# Patient Record
Sex: MALE | Race: WHITE
[De-identification: names, ages, dates, MRNs, and addresses within clinical notes are randomized per-mention and may not be internally consistent; named-entity substitution may affect disease eponyms.]

---

## 2022-06-07 ENCOUNTER — HOSPITAL ENCOUNTER (INPATIENT)
Dept: HOSPITAL 4 - SED | Age: 82
LOS: 2 days | Discharge: HOME | DRG: 69 | End: 2022-06-09
Attending: FAMILY MEDICINE | Admitting: FAMILY MEDICINE
Payer: MEDICARE

## 2022-06-07 VITALS — WEIGHT: 172 LBS | HEIGHT: 66 IN | BODY MASS INDEX: 27.64 KG/M2

## 2022-06-07 VITALS — SYSTOLIC BLOOD PRESSURE: 154 MMHG

## 2022-06-07 DIAGNOSIS — M17.0: ICD-10-CM

## 2022-06-07 DIAGNOSIS — Z20.822: ICD-10-CM

## 2022-06-07 DIAGNOSIS — E78.5: ICD-10-CM

## 2022-06-07 DIAGNOSIS — G45.9: Primary | ICD-10-CM

## 2022-06-07 DIAGNOSIS — I10: ICD-10-CM

## 2022-06-07 LAB
ALBUMIN SERPL BCP-MCNC: 3.7 G/DL (ref 3.4–4.8)
ALT SERPL W P-5'-P-CCNC: 24 U/L (ref 12–78)
ANION GAP SERPL CALCULATED.3IONS-SCNC: 9 MMOL/L (ref 5–15)
AST SERPL W P-5'-P-CCNC: 20 U/L (ref 10–37)
BASOPHILS # BLD AUTO: 0.3 K/UL (ref 0–0.2)
BASOPHILS NFR BLD AUTO: 3.1 % (ref 0–2)
BILIRUB SERPL-MCNC: 0.5 MG/DL (ref 0–1)
BUN SERPL-MCNC: 20 MG/DL (ref 8–21)
CALCIUM SERPL-MCNC: 8.1 MG/DL (ref 8.4–11)
CHLORIDE SERPL-SCNC: 105 MMOL/L (ref 98–107)
CREAT SERPL-MCNC: 1.14 MG/DL (ref 0.55–1.3)
EOSINOPHIL # BLD AUTO: 0.3 K/UL (ref 0–0.4)
EOSINOPHIL NFR BLD AUTO: 3.1 % (ref 0–4)
ERYTHROCYTE [DISTWIDTH] IN BLOOD BY AUTOMATED COUNT: 14.3 % (ref 9–15)
GFR SERPL CREATININE-BSD FRML MDRD: (no result) ML/MIN (ref 90–?)
GLUCOSE SERPL-MCNC: 93 MG/DL (ref 70–99)
HCT VFR BLD AUTO: 44.7 % (ref 36–54)
HGB BLD-MCNC: 15.3 G/DL (ref 14–18)
INR PPP: 1 (ref 0.8–1.2)
LYMPHOCYTES # BLD AUTO: 2.6 K/UL (ref 1–5.5)
LYMPHOCYTES NFR BLD AUTO: 30.2 % (ref 20.5–51.5)
MCH RBC QN AUTO: 30 PG (ref 27–31)
MCHC RBC AUTO-ENTMCNC: 34 % (ref 32–36)
MCV RBC AUTO: 89 FL (ref 79–98)
MONOCYTES # BLD MANUAL: 0.6 K/UL (ref 0–1)
MONOCYTES # BLD MANUAL: 7.5 % (ref 1.7–9.3)
NEUTROPHILS # BLD AUTO: 4.7 K/UL (ref 1.8–7.7)
NEUTROPHILS NFR BLD AUTO: 56.1 % (ref 40–70)
PLATELET # BLD AUTO: 183 K/UL (ref 130–430)
POTASSIUM SERPL-SCNC: 3.5 MMOL/L (ref 3.5–5.1)
PROTHROMBIN TIME: 10.4 SECS (ref 9.5–12.5)
RBC # BLD AUTO: 5.03 MIL/UL (ref 4.2–6.2)
SODIUM SERPLBLD-SCNC: 137 MMOL/L (ref 136–145)
WBC # BLD AUTO: 8.5 K/UL (ref 4.8–10.8)

## 2022-06-07 PROCEDURE — G0378 HOSPITAL OBSERVATION PER HR: HCPCS

## 2022-06-07 NOTE — NUR
PT BACK TO BED 2 PLACE ON C-MONITOR. PT DENIES ANY PAIN OR NEEDS AT THIS TIME. 
EKG DONE. PT TOLERATED WELL. PT IN POSITION OF COMFORT. WILL CONTINUE TO 
MONITOR.

## 2022-06-07 NOTE — NUR
PT HAS NO FACIAL DROOP, STRONG  AND PULLS BILATER UPPER AND LOWER 
EXTREMITIES. NO NEURO DEFICITS NOTED AT THIS TIME.

## 2022-06-07 NOTE — NUR
REPORT AND CARE TO GLORIA VIVAR WITH FULL RETURNED VERBAL UNDERSTANDING. PT IN 
POSITION OF COMFORT. VSS. DENIES ANY PAIN OR NEEDS AT THIS TIME.

## 2022-06-07 NOTE — NUR
PT TRANSPORTED TO CT ON AMBULANCE GURNEY. PT STABLE A&OX4, NO S/S OF DISTRESS 
NOTED. PT DENIES ANY PAIN AT THIS TIME.

## 2022-06-07 NOTE — NUR
PT ARRIVES BY EMS FOR POSSIBLE STROKE, DR. MOSER PRESENT WITH MYSELF FOR 
EXAM. REPORT GIVEN BY EMS PARAMEDIC. CODE STROKE ACTIVATED


-------------------------------------------------------------------------------

Addendum: 06/07/22 at 2108 by [email or web address]

-------------------------------------------------------------------------------

PT PLACE IN ALSO PLACED IN [facility]

## 2022-06-07 NOTE — NUR
Pt resting comfortably in bed. No signs of acute distress. Breathing adequately 
on RA. Fam member at bedside.

## 2022-06-08 VITALS — SYSTOLIC BLOOD PRESSURE: 137 MMHG

## 2022-06-08 VITALS — SYSTOLIC BLOOD PRESSURE: 134 MMHG

## 2022-06-08 RX ADMIN — ENOXAPARIN SODIUM SCH MG: 40 INJECTION SUBCUTANEOUS at 09:43

## 2022-06-08 RX ADMIN — Medication SCH MG: at 09:00

## 2022-06-08 NOTE — NUR
OPENING NOTE

REPORT RECEIVED FROM DAYSHIFT NURSE. PATIENT RECEIVED LYING IN BED, AWAKE, NO S/S OF ACUTE 
DISTRESS, PATIENT DENIES PAIN. BREATHING EVEN AND UNLABORED. IV SITE PATENT, NO SIGNS OF 
INFILTRATION OR INFECTION NOTED. SKIN WARM AND DRY TO TOUCH. PATIENT VERBALIZED AND 
DEMONSTRATED PROPER USE OF CALL LIGHT. SAFETY PRECAUTIONS IN PLACE. WILL CONTINUE TO 
MONITOR.

## 2022-06-08 NOTE — NUR
education

provided patient with education regarding TIA, answered all questions, patient verbalized 
understanding.  will continue to educate patient regarding diagnosis.

## 2022-06-08 NOTE — NUR
-------------------------------------------------------------------------------

           *** Note undone in EDM - 06/08/22 at 0948 by [email or web address] ***            

-------------------------------------------------------------------------------

Patient given written and verbal discharge instructions and verbalizes 
understanding.  ER MD discussed with patient the results and treatment 
provided. Patient in stable condition. ID arm band removed.Patient educated on 
pain management and to follow up with PMD. Pain Scale [].

## 2022-06-08 NOTE — NUR
ADMISSION:

The patient, BLAIR COOK, 82 y/o, M admitted by HENNA SIGALA MD, was given written 
information regarding hospital policies, unit procedures and contact persons.  obtained VS,

## 2022-06-08 NOTE — NUR
closing note

Provided sbar to night RN.  Patient in bed respirations even, non labored, bed in low and 
locked position call light within reach.  Endorsed care to night RN

## 2022-06-08 NOTE — NUR
RECEIVED PATIENT IN BED #2 STABLE, NAD, VSS, AAOx3, CURRENTLY ADMITTED AND 
AWAITING A BED IN TELEMETRY. CURRENTLY, NO DEFICITS NOTED. PT FAMILY TO 
POSSIBLY BRING 3 MEDICATIONS FOR RECONCILIATION.

## 2022-06-08 NOTE — NUR
Admit bed requested 



Patient will be admitted to care of   

Admitted to TELE unit.

Diagnosis: TIA

Inpatient (Yes or No)  Y

Observation (Yes or No) Y

Orientation concerns or request close to nursing station  (Yes or No) N

Covid Status: NEGATIVE

On vent or bipap: N

Isolation requirements: N

From Home (Yes or if No enter name of facility) YES 

Med Rec Completed (Yes of No) FAMILY MEMBER TO BRING IN AM

## 2022-06-08 NOTE — NUR
CONSULTATION PAGED



REASON FOR CONSULTATION:TIA

WAS CONSULT CALLED?Y

-PERSON WHO WAS NOTIFIED:INFORMED SB ATWOOD

CONSULTING PHYSICIAN:DR.ALI,MOHSEN

CONSULTANT SPECIALTY:NEURO

CONSULTANT PHONE NUMBER:[phone]

REQUESTING PHYSICIAN:HENNA RAO

## 2022-06-08 NOTE — NUR
Patient will be admitted to care of [facility].  Admitted to TELE unit.  Will go 
to room 128 B.  Belongings list completed.  Complete and up to date summary 
report printed. SBAR report given TO TELE RN FOR CONTINUITY OF CARE with 
opportunity for questions.

## 2022-06-09 VITALS — SYSTOLIC BLOOD PRESSURE: 128 MMHG

## 2022-06-09 VITALS — SYSTOLIC BLOOD PRESSURE: 136 MMHG

## 2022-06-09 VITALS — SYSTOLIC BLOOD PRESSURE: 133 MMHG

## 2022-06-09 RX ADMIN — Medication SCH MG: at 09:17

## 2022-06-09 RX ADMIN — ENOXAPARIN SODIUM SCH MG: 40 INJECTION SUBCUTANEOUS at 09:17

## 2022-06-09 NOTE — NUR
RECEIVED ORDER FROM DR. SIGALA, PT MAY DISCHARGE TODAY, NO NEW MEDICATIONS TO BE 
PRESCRIBED. PT MADE AWARE AND STATED HE DID NOT HAVE A RIDE HOME. PT STATED HE WILL DOWNLOAD 
AN [employer] ERWIN AND ORDER ONE. THIS NURSE ORIENTED TO HIS PHONE AND HOW TO DOWNLOAD ERWIN. PT 
VERBALIZED APPRECIATION.

## 2022-06-09 NOTE — NUR
PT DISCHARGED TO HOME IN NO DISTRESS. PT BROTHER MET PT IN [facility] AND DROVE PT HOME. 
DISCHARGE FORMS REVIEWED WITH PT AND ALL QUESTIONS AND CONCERNS ADDRESSED. TELEMONITOR 
REMOVED AND RETURNED TO MONITOR TECH. IV CATH TO [facility] REMOVED INTACT. SITE WNL. DRESSING CDI. 
PT DENIES PAIN UPON DISCHARGE. PT TAKEN TO [facility] IN W/C ACCOMPANIED BY THIS RN. ALL PERSONAL 
BELONGINGS TAKEN WITH PT.

## 2022-06-09 NOTE — NUR
CLOSING NOTE

PATIENT IN BED, EYES CLOSED, RESTING. NO S/S OF ACUTE DISTRESS NOTED. BREATHING IS EVEN AND 
UNLABORED. IV SITE PATENT, NO SIGNS OF INFILTRATION OR INFECTION NOTED. ALL NEEDS MET 
THROUGHOUT SHIFT. FALL AND SAFETY PRECAUTIONS MAINTAINED THROUGHOUT SHIFT. WILL CONTINUE TO 
MONITOR UNTIL PATIENT CARE IS ENDORSED TO ONCOMING DAYSHIFT NURSE.

## 2022-06-09 NOTE — NUR
SCHEDULED MEDS GIVEN AND TOLERATED WELL B/P 136/74. HR 57. DENIES PAIN, HA AND DIZZINESS. NO 
S/S OF CVA NOTED. CALL LIGHT WITHIN REACH.

## 2022-06-09 NOTE — NUR
PT EVAL ON PT SHOWS PT CAN AMBULATE INDEPENDENTLY, PT HAS SIGNIFICANT OTHER AT HOME TO 
ASSIST NEEDS.

## 2022-06-09 NOTE — NUR
RECEIVED PT FROM [facility]ILL[facility]. PT IS AAOX4. TELE IN PLACE READING NSR WITH PACS. PT DENIES C/P 
AND PRESSURE. PT DENIES H/A AND DIZZINESS. DENIES S/S OF STROKE. IV CATH TO LAC 20G S/L, 
SITE WNL, DRESSING CDI. RESP E/U. ON R/A. NO COUGH OR SOB NOTED. ABDOMEN SOFT, NONTENDER, 
NONDISTENDED. BOWEL SOUNDS ACTIVE X4 QUADS. DENIES N/V/D/C, VOIDS FREELY, CONTINENT OF 
BOWEL. SKIN CDI. DISTAL PULSE NORMAL, NO EDEMA. CALL LIGHT WITHIN REACH, SIDE RAILS UP X2, 
BED IN LOWEST POSITION.